# Patient Record
Sex: FEMALE | Race: WHITE | HISPANIC OR LATINO
[De-identification: names, ages, dates, MRNs, and addresses within clinical notes are randomized per-mention and may not be internally consistent; named-entity substitution may affect disease eponyms.]

---
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
## 2024-01-15 ENCOUNTER — APPOINTMENT (RX ONLY)
Dept: URBAN - METROPOLITAN AREA CLINIC 321 | Facility: CLINIC | Age: 19
Setting detail: DERMATOLOGY
End: 2024-01-15

## 2024-01-15 ENCOUNTER — RX ONLY (OUTPATIENT)
Age: 19
Setting detail: RX ONLY
End: 2024-01-15

## 2024-01-15 DIAGNOSIS — L70.0 ACNE VULGARIS: ICD-10-CM | Status: STABLE

## 2024-01-15 PROCEDURE — 99213 OFFICE O/P EST LOW 20 MIN: CPT

## 2024-01-15 PROCEDURE — ? TREATMENT REGIMEN

## 2024-01-15 PROCEDURE — ? COUNSELING

## 2024-01-15 PROCEDURE — ? FULL BODY SKIN EXAM - DECLINED

## 2024-01-15 RX ORDER — SPIRONOLACTONE 100 MG/1
TABLET, FILM COATED ORAL
Qty: 30 | Refills: 1 | Status: ERX

## 2024-01-15 RX ORDER — CLINDAMYCIN PHOSPHATE AND BENZOYL PEROXIDE 10; 37.5 MG/G; MG/G
GEL TOPICAL
Qty: 50 | Refills: 0 | Status: ERX

## 2024-01-15 RX ORDER — TRETIONIN 0.5 MG/G
CREAM TOPICAL
Qty: 45 | Refills: 6 | Status: ERX

## 2024-01-15 ASSESSMENT — LOCATION ZONE DERM: LOCATION ZONE: FACE

## 2024-01-15 ASSESSMENT — LOCATION DETAILED DESCRIPTION DERM
LOCATION DETAILED: LEFT CENTRAL MALAR CHEEK
LOCATION DETAILED: RIGHT LATERAL MALAR CHEEK

## 2024-01-15 ASSESSMENT — LOCATION SIMPLE DESCRIPTION DERM
LOCATION SIMPLE: LEFT CHEEK
LOCATION SIMPLE: RIGHT CHEEK

## 2024-01-15 NOTE — PROCEDURE: TREATMENT REGIMEN
Detail Level: Zone
Continue Regimen: Onexton 1.2% topical gel QAM, \\n\\nTretinoin 0.05% topical cream PM\\n\\nSpironolactone 100 mg QD, pt tolerating medication well.

## 2024-07-08 ENCOUNTER — APPOINTMENT (RX ONLY)
Dept: URBAN - METROPOLITAN AREA CLINIC 321 | Facility: CLINIC | Age: 19
Setting detail: DERMATOLOGY
End: 2024-07-08

## 2024-07-08 DIAGNOSIS — L70.0 ACNE VULGARIS: ICD-10-CM

## 2024-07-08 PROCEDURE — ? COUNSELING

## 2024-07-08 PROCEDURE — ? FULL BODY SKIN EXAM - DECLINED

## 2024-07-08 PROCEDURE — ? TREATMENT REGIMEN

## 2024-07-08 PROCEDURE — ? PRESCRIPTION

## 2024-07-08 PROCEDURE — 99214 OFFICE O/P EST MOD 30 MIN: CPT

## 2024-07-08 RX ORDER — TRETIONIN 0.5 MG/G
CREAM TOPICAL
Qty: 45 | Refills: 11 | Status: ERX

## 2024-07-08 RX ORDER — SPIRONOLACTONE 100 MG/1
TABLET, FILM COATED ORAL
Qty: 30 | Refills: 3 | Status: ERX

## 2024-07-08 ASSESSMENT — LOCATION SIMPLE DESCRIPTION DERM
LOCATION SIMPLE: LEFT CHEEK
LOCATION SIMPLE: RIGHT CHEEK

## 2024-07-08 ASSESSMENT — LOCATION DETAILED DESCRIPTION DERM
LOCATION DETAILED: LEFT CENTRAL MALAR CHEEK
LOCATION DETAILED: RIGHT LATERAL MALAR CHEEK

## 2024-07-08 ASSESSMENT — LOCATION ZONE DERM: LOCATION ZONE: FACE

## 2024-07-08 NOTE — PROCEDURE: TREATMENT REGIMEN
Modify Regimen: Pt advised to be consistent with the Spironolactone and Onexton
Plan: Patient has not been consistent with spironolactone and onexton. \\n\\nPatient stated that she will be more consistent with medications.\\n\\nPatient will follow up in 3 months.
Detail Level: Zone
Continue Regimen: Onexton 1.2% topical gel QAM, \\n\\nTretinoin 0.05% topical cream PM\\n\\nSpironolactone 100 mg QD, pt tolerating medication well.